# Patient Record
Sex: FEMALE | Race: WHITE
[De-identification: names, ages, dates, MRNs, and addresses within clinical notes are randomized per-mention and may not be internally consistent; named-entity substitution may affect disease eponyms.]

---

## 2022-09-23 ENCOUNTER — HOSPITAL ENCOUNTER (EMERGENCY)
Dept: HOSPITAL 75 - ER | Age: 37
Discharge: HOME | End: 2022-09-23
Payer: SELF-PAY

## 2022-09-23 VITALS — DIASTOLIC BLOOD PRESSURE: 76 MMHG | SYSTOLIC BLOOD PRESSURE: 107 MMHG

## 2022-09-23 VITALS — WEIGHT: 182.98 LBS | HEIGHT: 67.01 IN | BODY MASS INDEX: 28.72 KG/M2

## 2022-09-23 DIAGNOSIS — G89.29: Primary | ICD-10-CM

## 2022-09-23 DIAGNOSIS — M54.50: ICD-10-CM

## 2022-09-23 DIAGNOSIS — M54.6: ICD-10-CM

## 2022-09-23 DIAGNOSIS — F17.210: ICD-10-CM

## 2022-09-23 PROCEDURE — 99281 EMR DPT VST MAYX REQ PHY/QHP: CPT

## 2022-09-23 NOTE — ED BACK PAIN
General


Chief Complaint:  Back Problems


Stated Complaint:  BACK PAIN


Nursing Triage Note:  


PT AMB TO TRIAGE W C/O CHRONIC UPPER AND LOWER BACK PAIN THAT IS WORSE TODAY. PT




DENIES INJURY, REPORTS SHE IS WORKING WITH PHYSICAL THERAPY AND PCP TO MANAGE 


PAIN BUT PCP IS OUT OF TOWN UNTIL OCTOBER. PT [id number].


Source of Information:  Patient


Exam Limitations:  No Limitations





History of Present Illness


Date Seen by Provider:  Sep 24, 2022


Time Seen by Provider:  18:45


Initial Comments


Patient is a 36 yo F who presents to the ED with severe chronic upper and lower 

back pain. She states he was recently diagnosed with chronic regional pain 

syndrome. She is currently taking neurontin for the pain. She is being treated 

by her PCP. She denies any trauma to the affected areas recently. She was seen 

at the walk-in clinic where she was referred here as "they couldn't do anything 

there."


Timing/Duration:  Other (several months)


Severity:  Severe


Pain/Injury Location:  Back


Method of Injury:  Unknown





Allergies and Home Medications


Patient Home Medication List


Home Medication List Reviewed:  Yes





Review of Systems


Constitutional:  see HPI


EENTM:  see HPI


Respiratory:  see HPI


Cardiovascular:  see HPI


Genitourinary:  see HPI


Musculoskeletal:  back pain


Skin:  see HPI


Psychiatric/Neurological:  No Symptoms Reported, See HPI





Past Medical-Social-Family Hx


Patient Social History


Tobacco Use?:  Yes


Tobacco type used:  Cigarettes


Smoking Status:  Current Everyday Smoker


Use of E-Cig and/or Vaping dev:  No


Substance use?:  No


Alcohol Use?:  No





Immunizations Up To Date


Influenza Vaccine Up-to-Date:  No; Not Current


First/Initial COVID19 Vaccinat:  2021


Second COVID19 Vaccination Luis:  2021


Third COVID19 Vaccination Date:  2022


COVID19 Vaccine :  MODERNA





Past Medical History


Surgery/Hospitalization HX:  


COMPLEX REGIONAL PAIN SYNDROME


Last Menstrual Period:  Sep 17, 2022





Physical Exam


Vital Signs





Vital Signs - First Documented








 9/23/22





 18:30


 


Temp 36.4


 


Pulse 119


 


Resp 20


 


B/P (MAP) 102/71 (81)


 


Pulse Ox 99


 


O2 Delivery Room Air





Capillary Refill : Less Than 3 Seconds


Height, Weight, BMI


Height: '"


Weight: lbs. oz. kg; 28.00 BMI


Method:


General Appearance:  No Apparent Distress, WD/WN


HEENT:  PERRL/EOMI, TMs Normal, Normal ENT Inspection, Pharynx Normal


Neck:  Full Range of Motion, Normal Inspection, Non Tender, Supple


Cardiovascular:  Regular Rate, Rhythm, No Edema, No Gallop, No JVD, No Murmur, 

Normal Peripheral Pulses


Respiratory:  Chest Non Tender, Lungs Clear, Normal Breath Sounds, No Accessory 

Muscle Use, No Respiratory Distress


Gastrointestinal:  Normal Bowel Sounds, No Organomegaly, No Pulsatile Mass, Non 

Tender, Soft


Neurologic/Psychiatric:  Alert, Oriented x3, No Motor/Sensory Deficits, Normal 

Mood/Affect, CNs II-XII Norm as Tested


Skin:  Normal Color, Warm/Dry





Progress/Results/Core Measures


Results/Orders


Vital Signs/I&O











 9/23/22 9/23/22





 18:30 19:20


 


Temp 36.4 


 


Pulse 119 110


 


Resp 20 16


 


B/P (MAP) 102/71 (81) 107/76


 


Pulse Ox 99 100


 


O2 Delivery Room Air 














Blood Pressure Mean:                    81











Progress


Progress Note :  


Progress Note


Patient is nontoxic and well hydrated on exam. Vital signs are reassuring. I 

explained therapeutic options that are available in the ED. I offered ketorolac 

which was declined. I then offered a dose of opioid analgesia in the ED. I 

explained I am unable to prescribe narcotics to go home with due to the pain 

being chronic in nature as well as she was being treated by another physician 

for the pain. Patient declined the opioid narcotic and asked to go home. I 

encouraged her to closely follow up with her PCP to establish a plan for pain 

management as well as any other management options. She verbalized 

understanding.





Departure


Impression





   Primary Impression:  


   Chronic back pain


   Qualified Codes:  M54.9 - Dorsalgia, unspecified; G89.29 - Other chronic pain


Disposition:  01 HOME, SELF-CARE


Condition:  Stable





Departure-Patient Inst.


Decision time for Depature:  19:15


Referrals:  


NO,LOCAL PHYSICIAN (PCP/Family)


Primary Care Physician


Patient Instructions:  Chronic Pain (DC)











CLEO GALLAGHER             Sep 23, 2022 19:17